# Patient Record
Sex: MALE
[De-identification: names, ages, dates, MRNs, and addresses within clinical notes are randomized per-mention and may not be internally consistent; named-entity substitution may affect disease eponyms.]

---

## 2022-01-05 PROBLEM — Z00.00 ENCOUNTER FOR PREVENTIVE HEALTH EXAMINATION: Status: ACTIVE | Noted: 2022-01-05

## 2022-01-06 ENCOUNTER — APPOINTMENT (OUTPATIENT)
Dept: PULMONOLOGY | Facility: CLINIC | Age: 22
End: 2022-01-06
Payer: COMMERCIAL

## 2022-01-06 VITALS
BODY MASS INDEX: 20 KG/M2 | HEIGHT: 68 IN | WEIGHT: 132 LBS | OXYGEN SATURATION: 97 % | DIASTOLIC BLOOD PRESSURE: 70 MMHG | TEMPERATURE: 98 F | SYSTOLIC BLOOD PRESSURE: 120 MMHG | RESPIRATION RATE: 16 BRPM | HEART RATE: 100 BPM

## 2022-01-06 DIAGNOSIS — F12.90 CANNABIS USE, UNSPECIFIED, UNCOMPLICATED: ICD-10-CM

## 2022-01-06 DIAGNOSIS — Z87.891 PERSONAL HISTORY OF NICOTINE DEPENDENCE: ICD-10-CM

## 2022-01-06 DIAGNOSIS — R04.2 HEMOPTYSIS: ICD-10-CM

## 2022-01-06 DIAGNOSIS — Z78.9 OTHER SPECIFIED HEALTH STATUS: ICD-10-CM

## 2022-01-06 PROCEDURE — 99202 OFFICE O/P NEW SF 15 MIN: CPT

## 2022-01-06 NOTE — HISTORY OF PRESENT ILLNESS
[Never] : never [Former] : former [Current] : current [TextBox_4] : 22 yo male presents for evaluation of PRN streaky hemoptysis for six months. The patient denies fever, chest pain, night sweats or weight loss. The patient denies prior medical problems. He stopped vaping eight months ago but continues to smoke [place]. He never smoked cigarettes. He is not covid 19 vaccinated! [TextBox_22] : quit 8 months ago [TextBox_29] : Denies snoring, daytime somnolence, apneic episodes, AM headaches

## 2022-01-06 NOTE — DISCUSSION/SUMMARY
[FreeTextEntry1] : 20 yo male with streaky hemoptysis. Cessation of all types of smoking stressed. A chest xray will be performed. ENT evaluation if problem persists. The covid 19 vaccine was strongly recommended.

## 2022-01-11 ENCOUNTER — NON-APPOINTMENT (OUTPATIENT)
Age: 22
End: 2022-01-11